# Patient Record
Sex: FEMALE | Race: BLACK OR AFRICAN AMERICAN | NOT HISPANIC OR LATINO | Employment: OTHER | URBAN - METROPOLITAN AREA
[De-identification: names, ages, dates, MRNs, and addresses within clinical notes are randomized per-mention and may not be internally consistent; named-entity substitution may affect disease eponyms.]

---

## 2019-08-13 ENCOUNTER — OFFICE VISIT (OUTPATIENT)
Dept: ENDOCRINOLOGY | Age: 34
End: 2019-08-13

## 2019-08-13 VITALS
HEART RATE: 62 BPM | SYSTOLIC BLOOD PRESSURE: 108 MMHG | HEIGHT: 64 IN | OXYGEN SATURATION: 94 % | WEIGHT: 170 LBS | BODY MASS INDEX: 29.02 KG/M2 | DIASTOLIC BLOOD PRESSURE: 64 MMHG

## 2019-08-13 DIAGNOSIS — E01.0 THYROMEGALY: Primary | ICD-10-CM

## 2019-08-13 PROCEDURE — 99243 OFF/OP CNSLTJ NEW/EST LOW 30: CPT | Performed by: INTERNAL MEDICINE

## 2019-08-13 NOTE — PROGRESS NOTES
Chief Complaint   Patient presents with   • Abnormal Lab   NEW PATIENT APPOINTMENT/ ABNORMAL THYROID LABS     HPI  Sobia Smith,34 y.o. -American female is here as a new patient for evaluation of thyromegaly.  Consulted by Dr. Correa.  Patient reports that 2 months ago when she went to her OB/GYN on a routine physical examination they felt like her thyroid was enlarged.  This led to the thyroid ultrasound.  Thyroid ultrasound from May 2019 showed thyromegaly in the bilateral thyroid lobes but no nodules were noted.  Thyroid labs were also checked which according to the patient have been within normal limits.  No family history of thyroid disease.  She has 1 kid of her own, no issues in getting pregnant.  Second pregnancy she ended up having a miscarriage.  No complaints of fatigue, weight has been stable, no dry skin, no hair loss, sleep is good.  No hyperthyroid symptoms either.  No complaints of shortness of breath, difficulty in swallowing or change in voice.    Denied family history of diabetes.  Reports that her primary care physician is monitoring her blood sugars-HbA1c annually    Reviewed primary care physician's/consulting physician documentation and lab results :     I have reviewed the patient's allergies, medicines, past medical hx, family hx and social hx in detail.    Past Medical History:   Diagnosis Date   • Acne    • Candidiasis of vagina    • Ganglion    • Overweight    • Parotid neoplasm    • Period pain    • Skin neoplasm        Family History   Problem Relation Age of Onset   • No Known Problems Mother    • No Known Problems Father    • No Known Problems Sister    • No Known Problems Brother    • No Known Problems Maternal Grandmother    • No Known Problems Maternal Grandfather    • No Known Problems Paternal Grandmother    • No Known Problems Paternal Grandfather        Social History     Socioeconomic History   • Marital status: Unknown     Spouse name: Not on file   • Number of  "children: Not on file   • Years of education: Not on file   • Highest education level: Not on file   Tobacco Use   • Smoking status: Unknown If Ever Smoked       No Known Allergies    No current outpatient medications on file.     Review of Systems   Constitutional: Negative for appetite change, fatigue and fever.   Eyes: Negative for visual disturbance.   Respiratory: Negative for shortness of breath.    Cardiovascular: Negative for palpitations and leg swelling.   Gastrointestinal: Negative for abdominal pain and vomiting.   Endocrine: Negative for polydipsia and polyuria.   Musculoskeletal: Negative for joint swelling and neck pain.   Skin: Negative for rash.   Neurological: Negative for weakness and numbness.   Psychiatric/Behavioral: Negative for behavioral problems.       Objective:    /64   Pulse 62   Ht 162.6 cm (64\")   Wt 77.1 kg (170 lb)   SpO2 94%   BMI 29.18 kg/m²     Physical Exam  Gen exam       No distress, appears stated age  EYES:             PERRL, anicteric sclera  ENT:                External ears/nose normal, thyromegaly  NECK:             No adenopathy, midline trachea  LUNGS:           Normal chest on inspection, palpation and normal breath sounds.    CV:                  Normal S1S2, without murmur  ABD:                Non tender, non distended, no hepatosplenomegaly, +BS  EXT:                No edema, cyanosis or clubbing    Results Review:    I reviewed the patient's new clinical results.    No results found for any previous visit.     Thyromegaly  Reassured the patient that thyromegaly could sometimes be a possibility after pregnancies or even she could be having that as a young child itself.  Since patient does not have symptoms of shortness of breath, difficulty in swallowing we would not be worrying about her mildly increased thyroid gland       Noted that thyroid function has been within normal limits, reviewed the lab data sent by the primary care physician.    We will be " "discharging the patient from the clinic.    Thank you for asking me to see your patient Sobia Smith in consultation.        Masha Lund MD  08/13/19    EMR Dragon / transcription disclaimer:     \"Dictated utilizing Dragon dictation\".         "

## 2019-08-15 ENCOUNTER — TELEPHONE (OUTPATIENT)
Dept: ENDOCRINOLOGY | Age: 34
End: 2019-08-15

## 2019-08-15 NOTE — TELEPHONE ENCOUNTER
SPOKE WITH PATIENT ABOUT THE DOCUMENTATION THAT SHE NEEDED FAXED TO HER PCP  PATIENT VOICE UNDERSTANDING          ----- Message from Oksana Saeed sent at 8/14/2019  1:56 PM EDT -----  Contact: patient  Patient said when she saw Dr. Lund yesterday for her thyroid  that Dr. Lund was going to put a note in her chart that she does not recommend any kind of treatment. She said she has to have something to submit with her  paperwork that she is cleared. She said it needs to be specific that labs are normal and not recommending any kind of treatment and no medications.   When I told patient that Dr. Lund is out of the office today and you have 24 to 48 hours to reply she said she needs this by the end of the week.     I read the 2 letters from Dr. Lund from 8-13-19 to Dr. Correa to  patient and she asked if she can see those in my chart.     Pt - [phone]

## 2020-02-04 ENCOUNTER — HOSPITAL ENCOUNTER (OUTPATIENT)
Dept: URGENT CARE | Facility: CLINIC | Age: 35
Discharge: HOME | End: 2020-02-04
Attending: EMERGENCY MEDICINE

## 2020-02-06 LAB — BACTERIA SPEC AEROBE CULT: NORMAL

## 2020-10-16 ENCOUNTER — OFFICE VISIT CONVERTED (OUTPATIENT)
Dept: OTOLARYNGOLOGY | Facility: CLINIC | Age: 35
End: 2020-10-16
Attending: OTOLARYNGOLOGY

## 2020-12-31 ENCOUNTER — HOSPITAL ENCOUNTER (OUTPATIENT)
Dept: URGENT CARE | Facility: CLINIC | Age: 35
Discharge: HOME | End: 2020-12-31
Attending: EMERGENCY MEDICINE

## 2021-01-01 LAB — SARS-COV-2 RNA SPEC QL NAA+PROBE: NOT DETECTED

## 2021-01-02 LAB — BACTERIA SPEC AEROBE CULT: NORMAL

## 2021-04-13 ENCOUNTER — OFFICE VISIT CONVERTED (OUTPATIENT)
Dept: ORTHOPEDIC SURGERY | Facility: CLINIC | Age: 36
End: 2021-04-13
Attending: ORTHOPAEDIC SURGERY

## 2021-05-10 NOTE — H&P
History and Physical      Patient Name: Sobia Smith   Patient ID: [id number]   Sex: Female   YOB: 1985    Primary Care Provider: Paige SPRAGUE   Referring Provider: Paige SPRAGUE    Visit Date: October 16, 2020    Provider: Abdulaziz Bueno MD   Location: [facility]   Location Address: [address] [place] [address], [address] [place] [address]  [id number]   Location Phone: [phone]          Chief Complaint     1.  Throat pain    2.  History of laryngitis    3.  Intermittent voice hoarseness       History Of Present Illness  Sobia Smith is a 35 year old /Black female who presents to the office today as a consult from Paige SPRAGUE.      She presents the clinic today for evaluation of issues with intermittent laryngitis, throat pain, and voice hoarseness.  She notes that she is not having any significant trouble with her voice today, but does feel some throat discomfort.  She denies any GERD issues.  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  She describes pain along her larynx.  She notes that she previously had voice changes and hoarseness.  She does find it hard to raise her voice when she is having difficulty with this.  She notes that the only thing that helps her is voice rest.  She denies any progressive throat pain symptoms or dysphagia.  Her weight has been stable.  She is not a smoker.       Past Medical History  Throat discomfort; Voice hoarseness         Past Surgical History  Cyst Removal; Mole Removal         Allergy List  NO KNOWN DRUG ALLERGIES         Family Medical History  Family history of heart disease         Social History  Tobacco (Never)         Review of Systems  · Constitutional  o Denies  o : fever, night sweats, weight loss  · Eyes  o Denies  o : discharge from eye, impaired vision  · HENT  o Admits  o : *See  "HPI  · Cardiovascular  o Denies  o : chest pain, irregular heart beats  · Respiratory  o Denies  o : shortness of breath, wheezing, coughing up blood  · Gastrointestinal  o Denies  o : heartburn, reflux, vomiting blood  · Genitourinary  o Denies  o : frequency  · Integument  o Denies  o : rash, skin dryness  · Neurologic  o Denies  o : seizures, loss of balance, loss of consciousness, dizziness  · Endocrine  o Denies  o : cold intolerance, heat intolerance  · Heme-Lymph  o Denies  o : easy bleeding, anemia      Vitals  Date Time BP Position Site L\R Cuff Size HR RR TEMP (F) WT  HT  BMI kg/m2 BSA m2 O2 Sat FR L/min FiO2 HC       10/16/2020 02:45 PM        97.3 185lbs 2oz 5'  4\" 31.78 1.95             Physical Examination  · Constitutional  o Appearance  o : well developed, well-nourished, alert and in no acute distress, voice clear and strong  · Head and Face  o Head  o :   § Inspection  § : no deformities or lesions  o Face  o :   § Inspection  § : No facial lesions; House-Brackmann I/VI bilaterally  § Palpation  § : No TMJ crepitus nor  muscle tenderness bilaterally  · Eyes  o Vision  o :   § Visual Fields  § : Extraocular movements are intact. No spontaneous or gaze-induced nystagmus.  o Conjunctivae  o : clear  o Sclerae  o : clear  o Pupils and Irises  o : pupils equal, round, and reactive to light.   · Ears, Nose, Mouth and Throat  o Ears  o :   § External Ears  § : appearance within normal limits, no lesions present  § Otoscopic Examination  § : tympanic membrane appearance within normal limits bilaterally without perforations, well-aerated middle ears  § Hearing  § : intact to conversational voice both ears  o Nose  o :   § External Nose  § : appearance normal  § Intranasal Exam  § : mucosa within normal limits, vestibules normal, no intranasal lesions present, septum midline, sinuses non tender to percussion  o Oral Cavity  o :   § Oral Mucosa  § : oral mucosa normal without pallor or " cyanosis  § Lips  § : lip appearance normal  § Teeth  § : normal dentition for age  § Gums  § : gums pink, non-swollen, no bleeding present  § Tongue  § : tongue appearance normal; normal mobility  § Palate  § : hard palate normal, soft palate appearance normal with symmetric mobility  o Throat  o :   § Oropharynx  § : no inflammation or lesions present, tonsils within normal limits  § Hypopharynx  § : appearance within normal limits, superior epiglottis within normal limits  § Larynx  § : appearance within normal limits, vocal cords within normal limits, no lesions present  o Nasopharyngoscopy  o : The patients nares were anesthetized with Lidocaine with Afrin. After this was done, the flexible nasopharyngoscope was passed through both the left and right sides. The nasal cavity is free of any lesions, polyps, or purulence. The vocal folds are bilaterally mobile with good excursion. Vocal folds are slightly thin, symmetric. There is some interarytenoid pachydermia, consistent with GERD.  · Neck  o Inspection/Palpation  o : normal appearance, no masses or tenderness, trachea midline; thyroid size normal, nontender, no nodules or masses present on palpation  · Respiratory  o Respiratory Effort  o : breathing unlabored  o Inspection of Chest  o : normal appearance, no retractions  · Cardiovascular  o Heart  o : regular rate and rhythm  · Lymphatic  o Neck  o : no lymphadenopathy present  o Supraclavicular Nodes  o : no lymphadenopathy present  o Preauricular Nodes  o : no lymphadenopathy present  · Skin and Subcutaneous Tissue  o General Inspection  o : Regarding face and neck - there are no rashes present, no lesions present, and no areas of discoloration  · Neurologic  o Cranial Nerves  o : cranial nerves II-XII are grossly intact bilaterally  o Gait and Station  o : normal gait, able to stand without diffculty  · Psychiatric  o Judgement and Insight  o : judgment and insight intact  o Mood and Affect  o : mood  normal, affect appropriate          Assessment  · GERD (gastroesophageal reflux disease)     530.81/K21.9  · Voice hoarseness     784.42/R49.0  · Throat pain in adult     784.1/R07.0  · Presbylarynx     478.79/J38.7      Plan  · Orders  o Laryngoscopy (Flex) [facility] ([id number]) - 784.1/R07.0, 784.42/R49.0, 478.79/J38.7 - 10/21/2020  · Medications  o Medications have been Reconciled  o Transition of Care or Provider Policy  · Instructions  o She presents the clinic today for evaluation of issues with intermittent laryngitis, throat pain, and voice hoarseness. She notes that she is not having any significant trouble with her voice today, but does feel some throat discomfort. She denies any GERD issues. 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 She describes pain along her larynx. She notes that she previously had voice changes and hoarseness. She does find it hard to raise her voice when she is having difficulty with this. She notes that the only thing that helps her is voice rest. She denies any progressive throat pain symptoms or dysphagia. Her weight has been stable. She is not a smoker. On examination today her voice was clear and strong. I did perform nasolaryngoscopy and this showed for the most part a normal exam, but she did have some mild vocal fold thinning as well as interarytenoid pachydermia, consistent with GERD. We discussed GERD precautions including head of bed elevation, avoiding late night meals and snacking, and avoiding foods that exacerbate GERD. I reassured her that I did not see any lesions or worrisome findings. I also suggested vocal therapy should she continue to have issues. We discussed taking breaks after every hour of speaking to allow her voice to rest. I will plan to see her back in the clinic on an as-needed basis should there be no improvement despite treatment for  this.  o Electronically Identified Patient Education Materials Provided Electronically  · Correspondence  o ENT Letter to Referring MD (Paige SPRAGUE) - 10/21/2020            Electronically Signed by: Abdulaziz Bueno MD -Author on October 21, 2020 11:26:54 AM

## 2021-05-11 NOTE — H&P
History and Physical      Patient Name: Sobia Smith   Patient ID: [id number]   Sex: Female   YOB: 1985    Primary Care Provider: Paige SPRAGUE   Referring Provider: Paige SPRAGUE    Visit Date: April 13, 2021    Provider: Marcelo Luna MD   Location: [facility]   Location Address: [address] [place] [address]  [id number]   Location Phone: [phone]          Chief Complaint  · Right knee pain       History Of Present Illness  Sobia Smith is a 35 year old /Black female who presents today to [facility].      The patient presents here today for evaluation of her right knee. The patient has been having right knee pain for several months. She reports a previous injury to the knee. She was evaluated with x-rays and an MRI of the right knee. She has attended one physical therapy season, which was today. They did not really do much treatment for her since she had the appointment with us later today. She denies previous treatments but does take occasional ibuprofen for the pain. The pain is mostly to the lateral aspect of the knee and is worse when she goes to bed her knee with weight on her foot. She denies instability and mechanical symptoms       Past Medical History  ***No Significant Medical History; Throat discomfort; Voice hoarseness         Past Surgical History  Cyst Removal; Mole Removal; Tonsillectomy         Allergy List  NO KNOWN DRUG ALLERGIES; NO KNOWN DRUG ALLERGIES       Allergies Reconciled  Family Medical History  Heart Disease; Family history of Arthritis; Family history of heart disease         Social History  Alcohol Use (Never); .; lives with children; Recreational Drug Use (Never); Tobacco (Never); Working         Review of Systems  · Constitutional  o Denies  o : fever, chills, weight loss  · Cardiovascular  o Denies  o : chest pain, shortness of breath  · Gastrointestinal  o Denies  o : liver disease, heartburn, nausea,  "blood in stools  · Genitourinary  o Denies  o : painful urination, blood in urine  · Integument  o Denies  o : rash, itching  · Neurologic  o Denies  o : headache, weakness, loss of consciousness  · Musculoskeletal  o Denies  o : painful, swollen joints  · Psychiatric  o Denies  o : drug/alcohol addiction, anxiety, depression      Vitals  Date Time BP Position Site L\R Cuff Size HR RR TEMP (F) WT  HT  BMI kg/m2 BSA m2 O2 Sat FR L/min FiO2        04/13/2021 03:31 PM      88 - R   173lbs 8oz 5'  4\" 29.78 1.89 98 %            Physical Examination  · Constitutional  o Appearance  o : well developed, well-nourished, no obvious deformities present  · Head and Face  o Head  o :   § Inspection  § : normocephalic  o Face  o :   § Inspection  § : no facial lesions  · Eyes  o Conjunctivae  o : conjunctivae normal  o Sclerae  o : sclerae white  · Ears, Nose, Mouth and Throat  o Ears  o :   § External Ears  § : appearance within normal limits  § Hearing  § : intact  o Nose  o :   § External Nose  § : appearance normal  · Neck  o Inspection/Palpation  o : normal appearance  o Range of Motion  o : full range of motion  · Respiratory  o Respiratory Effort  o : breathing unlabored  o Inspection of Chest  o : normal appearance  o Auscultation of Lungs  o : no audible wheezing or rales  · Cardiovascular  o Heart  o : regular rate  · Gastrointestinal  o Abdominal Examination  o : soft and non-tender  · Skin and Subcutaneous Tissue  o General Inspection  o : intact, no rashes  · Psychiatric  o General  o : Alert and oriented x3  o Judgement and Insight  o : judgment and insight intact  o Mood and Affect  o : mood normal, affect appropriate  · Right Knee  o Inspection  o : Tender to palpation over the lateral knee over the lateral distal femur. No effusion. Mild swelling. Skin intact. Positive EHL, FHL, GS, and TA. Sensation intact to light touch all 5 nerves of the foot. Positive Pulses. Stable to anterior and posterior drawer. Stable " to varus and valgus stress. Negative Lachmans. Negative McMurrays.   · Imaging  o Imaging  o : MRI 3/2021 ProScan- laterally tilted patella. Cartilage blister lateral patella. Small effusion.           Assessment  · Right knee pain, unspecified chronicity     719.46/M25.561  · Patellar maltracking, right     719.86/M22.8X1  · Right knee Chondromalacia, patella     733.92/M94.20      Plan  · Medications  o Medications have been Reconciled  o Transition of Care or Provider Policy  · Instructions  o Reviewed the patient's Past Medical, Social, and Family history as well as the ROS at today's visit, no changes.  o Call or return if worsening symptoms.  o This note was transcribed by Eileen Maravilla. [email or web address].  o Discussed the treatment options with the patient. She wished to proceed with conservative treatment. Plan to continue physical therapy and ibuprofen as needed. Follow up in 6-8 weeks to recheck.   o Electronically Identified Patient Education Materials Provided Electronically  · Referrals  o ID: [id number] Date: 04/13/2021 Type: Inbound  Specialty: Orthopedic Surgery            Electronically Signed by: Eileen Maravilla MA -Author on April 14, 2021 01:23:11 PM  Electronically Co-signed by: Marcelo Luna MD -Reviewer on April 14, 2021 10:30:13 PM

## 2021-05-14 VITALS — HEIGHT: 64 IN | OXYGEN SATURATION: 98 % | HEART RATE: 88 BPM | WEIGHT: 173.5 LBS | BODY MASS INDEX: 29.62 KG/M2

## 2021-05-14 VITALS — BODY MASS INDEX: 31.6 KG/M2 | WEIGHT: 185.12 LBS | HEIGHT: 64 IN | TEMPERATURE: 97.3 F

## 2021-07-15 PROBLEM — M22.8X1 PATELLAR MALTRACKING, RIGHT: Status: ACTIVE | Noted: 2021-07-15

## 2021-07-15 PROBLEM — M94.261 CHONDROMALACIA OF RIGHT KNEE: Status: ACTIVE | Noted: 2021-07-15

## 2021-08-12 ENCOUNTER — OFFICE VISIT (OUTPATIENT)
Dept: ORTHOPEDIC SURGERY | Facility: CLINIC | Age: 36
End: 2021-08-12

## 2021-08-12 VITALS — HEART RATE: 80 BPM | WEIGHT: 171.4 LBS | BODY MASS INDEX: 29.26 KG/M2 | HEIGHT: 64 IN | OXYGEN SATURATION: 98 %

## 2021-08-12 DIAGNOSIS — M25.561 ACUTE PAIN OF RIGHT KNEE: Primary | ICD-10-CM

## 2021-08-12 DIAGNOSIS — M76.31 ILIOTIBIAL BAND SYNDROME OF RIGHT SIDE: ICD-10-CM

## 2021-08-12 PROCEDURE — 99213 OFFICE O/P EST LOW 20 MIN: CPT | Performed by: ORTHOPAEDIC SURGERY

## 2021-08-12 PROCEDURE — 20552 NJX 1/MLT TRIGGER POINT 1/2: CPT | Performed by: ORTHOPAEDIC SURGERY

## 2021-08-12 RX ADMIN — METHYLPREDNISOLONE ACETATE 80 MG: 80 INJECTION, SUSPENSION INTRA-ARTICULAR; INTRALESIONAL; INTRAMUSCULAR; SOFT TISSUE at 10:47

## 2021-08-12 RX ADMIN — LIDOCAINE HYDROCHLORIDE 4 ML: 10 INJECTION, SOLUTION INFILTRATION; PERINEURAL at 10:47

## 2021-08-12 NOTE — PROGRESS NOTES
"Chief Complaint  Pain of the Right Knee     Subjective      Sobia Smith presents to [facility] for follow up evaluation of the right knee.  The pain is mostly to the lateral aspect of the knee and is worse when she goes to bed her knee with weight on her foot. She denies instability and mechanical symptoms. She reports a constant deep seated pain. She has attended physical therapy and taken ibuprofen with no relief. She has no new complaints.     No Known Allergies     Social History     Socioeconomic History   • Marital status: Unknown     Spouse name: Not on file   • Number of children: Not on file   • Years of education: Not on file   • Highest education level: Not on file   Tobacco Use   • Smoking status: Never Smoker   Substance and Sexual Activity   • Alcohol use: Never   • Drug use: Never        Review of Systems     Objective   Vital Signs:   Pulse 80   Ht 162.6 cm (64\")   Wt 77.7 kg (171 lb 6.4 oz)   SpO2 98%   BMI 29.42 kg/m²       Physical Exam  Constitutional:       Appearance: Normal appearance. He is well-developed and normal weight.   HENT:      Head: Normocephalic.      Right Ear: Hearing and external ear normal.      Left Ear: Hearing and external ear normal.      Nose: Nose normal.   Eyes:      Conjunctiva/sclera: Conjunctivae normal.   Cardiovascular:      Rate and Rhythm: Normal rate.   Pulmonary:      Effort: Pulmonary effort is normal.      Breath sounds: No wheezing or rales.   Abdominal:      Palpations: Abdomen is soft.      Tenderness: There is no abdominal tenderness.   Musculoskeletal:      Cervical back: Normal range of motion.   Skin:     Findings: No rash.   Neurological:      Mental Status: He is alert and oriented to person, place, and time.   Psychiatric:         Mood and Affect: Mood and affect normal.         Judgment: Judgment normal.       Ortho Exam      Right knee- ROM 0-130 degrees. Stable to varus/valgus stress. Stable to " anterior/posterior drawer. Neurovascularly intact. Tender to the lateral knee over the IT band. Positive EHL, FHL, GS and TA. Sensation intact to all 5 nerves of the foot. Positive pulses. Neurovascularly intact. Negative Lachman's. Negative Jory's.     Right Knee IT band     Date/Time: 8/12/2021 10:47 AM  Performed by: Marcelo Luna MD  Authorized by: Marcelo Luna MD   Preparation: Prepped with Iodine swab, used a 21G needle   Local anesthesia used: no    Anesthesia:  Local anesthesia used: no    Sedation:  Patient sedated: no    Patient tolerance: patient tolerated the procedure well with no immediate complications  Medications administered: 4 mL lidocaine 1 %; 80 mg methylPREDNISolone acetate 80 MG/ML            Imaging Results (Most Recent)     None           Result Review :       No results found.           Assessment and Plan     DX: IT band Syndrome, right knee    Discussed the risks and benefits of an IT band injection. The patient expressed understanding and wished to proceed. She tolerated the injection well.     Call or return if worsening symptoms.    Follow Up     6 weeks      Patient was given instructions and counseling regarding her condition or for health maintenance advice. Please see specific information pulled into the AVS if appropriate.     Scribed for Marcelo Luna MD by Eileen Maravilla.  08/12/21   10:16 EDT    I have personally performed the services described in this document as scribed by the above individual and it is both accurate and complete. Marcelo Luna MD 08/15/21

## 2021-08-15 RX ORDER — LIDOCAINE HYDROCHLORIDE 10 MG/ML
4 INJECTION, SOLUTION INFILTRATION; PERINEURAL
Status: COMPLETED | OUTPATIENT
Start: 2021-08-12 | End: 2021-08-12

## 2021-08-15 RX ORDER — METHYLPREDNISOLONE ACETATE 80 MG/ML
80 INJECTION, SUSPENSION INTRA-ARTICULAR; INTRALESIONAL; INTRAMUSCULAR; SOFT TISSUE
Status: COMPLETED | OUTPATIENT
Start: 2021-08-12 | End: 2021-08-12

## 2021-10-17 PROCEDURE — U0004 COV-19 TEST NON-CDC HGH THRU: HCPCS | Performed by: NURSE PRACTITIONER

## 2021-10-19 ENCOUNTER — TELEPHONE (OUTPATIENT)
Dept: URGENT CARE | Facility: CLINIC | Age: 36
End: 2021-10-19

## 2021-10-19 PROCEDURE — U0004 COV-19 TEST NON-CDC HGH THRU: HCPCS | Performed by: NURSE PRACTITIONER

## 2021-10-20 ENCOUNTER — TELEPHONE (OUTPATIENT)
Dept: URGENT CARE | Facility: CLINIC | Age: 36
End: 2021-10-20

## 2022-08-16 ENCOUNTER — OFFICE VISIT (OUTPATIENT)
Dept: FAMILY MEDICINE | Facility: CLINIC | Age: 37
End: 2022-08-16
Payer: OTHER GOVERNMENT

## 2022-08-16 VITALS
RESPIRATION RATE: 17 BRPM | TEMPERATURE: 98 F | OXYGEN SATURATION: 99 % | SYSTOLIC BLOOD PRESSURE: 128 MMHG | WEIGHT: 181.38 LBS | DIASTOLIC BLOOD PRESSURE: 90 MMHG | BODY MASS INDEX: 30.96 KG/M2 | HEIGHT: 64 IN | HEART RATE: 64 BPM

## 2022-08-16 DIAGNOSIS — M54.50 ACUTE BILATERAL LOW BACK PAIN WITHOUT SCIATICA: Primary | ICD-10-CM

## 2022-08-16 PROCEDURE — 96372 THER/PROPH/DIAG INJ SC/IM: CPT | Mod: ,,, | Performed by: NURSE PRACTITIONER

## 2022-08-16 PROCEDURE — 96372 PR INJECTION,THERAP/PROPH/DIAG2ST, IM OR SUBCUT: ICD-10-PCS | Mod: ,,, | Performed by: NURSE PRACTITIONER

## 2022-08-16 PROCEDURE — 99203 OFFICE O/P NEW LOW 30 MIN: CPT | Mod: 25,,, | Performed by: NURSE PRACTITIONER

## 2022-08-16 PROCEDURE — 99203 PR OFFICE/OUTPT VISIT, NEW, LEVL III, 30-44 MIN: ICD-10-PCS | Mod: 25,,, | Performed by: NURSE PRACTITIONER

## 2022-08-16 RX ORDER — DICLOFENAC SODIUM 75 MG/1
75 TABLET, DELAYED RELEASE ORAL 2 TIMES DAILY
Qty: 30 TABLET | Refills: 0 | Status: SHIPPED | OUTPATIENT
Start: 2022-08-16 | End: 2023-06-12

## 2022-08-16 RX ORDER — CYCLOBENZAPRINE HCL 10 MG
10 TABLET ORAL 3 TIMES DAILY PRN
Qty: 30 TABLET | Refills: 0 | Status: SHIPPED | OUTPATIENT
Start: 2022-08-16 | End: 2022-08-26

## 2022-08-16 RX ORDER — KETOROLAC TROMETHAMINE 30 MG/ML
60 INJECTION, SOLUTION INTRAMUSCULAR; INTRAVENOUS
Status: COMPLETED | OUTPATIENT
Start: 2022-08-16 | End: 2022-08-16

## 2022-08-16 RX ORDER — DEXAMETHASONE SODIUM PHOSPHATE 4 MG/ML
8 INJECTION, SOLUTION INTRA-ARTICULAR; INTRALESIONAL; INTRAMUSCULAR; INTRAVENOUS; SOFT TISSUE ONCE
Status: COMPLETED | OUTPATIENT
Start: 2022-08-16 | End: 2022-08-16

## 2022-08-16 RX ADMIN — DEXAMETHASONE SODIUM PHOSPHATE 8 MG: 4 INJECTION, SOLUTION INTRA-ARTICULAR; INTRALESIONAL; INTRAMUSCULAR; INTRAVENOUS; SOFT TISSUE at 12:08

## 2022-08-16 RX ADMIN — KETOROLAC TROMETHAMINE 60 MG: 30 INJECTION, SOLUTION INTRAMUSCULAR; INTRAVENOUS at 12:08

## 2022-08-16 NOTE — PATIENT INSTRUCTIONS
Alternate heat and ice for first 48 hours then  apply heat. You may do gently stretching if tolerable.    Moist warm compresss to area several times daily.  May use a heating pad on LOW to provide heat over a towel which was dampended with warm water. DO NOT FALL ASLEEP WITH HEATING PAD ON.  Do not stay in one position to long.  When sleeping on your back place a pillow under knees to reduce tension on back.  If sleeping on your side, place pillow between knees to keep spine in better alinement.  Wear supportive shoes such as tennis shoes for support of the lower back.  Take any medication as directed.    If you were not prescribed an anti-inflammatory medication, and if you do not have any history of stomach/intestinal ulcers, or kidney disease, or are not taking a blood thinner such as Coumadin, Plavix, Pradaxa, Eloquis, or Xaralta for example, it is OK to take over the counter Ibuprofen or Advil or Motrin or Aleve as directed.  Do not take these medications on an empty stomach.    If you were prescribed a narcotic medication, do not drive or operate heavy equipment or machinery while taking these medications.    If you lose control of your bowel and/or bladder, please go to the nearest Emergency Department immediately.  If you lose sensation in between your legs by your genitalia and/or rectum, please go to the nearest Emergency Department immediately.  If you lose control or sensation of any extremity, please go to the nearest Emergency Department immediately.

## 2022-08-16 NOTE — PROGRESS NOTES
Subjective:       Patient ID: Lissett MCQUEEN is a 37 y.o. female.    Chief Complaint: Back Pain (Lower back pain starting yesterday. Lifting heavy object. )    Bilateral lower back pain after lifting a heavy object a few days ago- denies nay numbness or tingling no issue with bowel or bladder incontinence    Back Pain  This is a recurrent problem. The current episode started in the past 7 days. The pain is present in the lumbar spine. The quality of the pain is described as aching. The pain does not radiate. The pain is at a severity of 4/10. The pain is moderate. The symptoms are aggravated by lying down, position and sitting. Pertinent negatives include no abdominal pain, bladder incontinence, bowel incontinence, chest pain, dysuria, fever, headaches, leg pain, numbness, paresis, paresthesias, pelvic pain, perianal numbness, tingling, weakness or weight loss. She has tried NSAIDs for the symptoms. The treatment provided no relief.     Review of Systems   Constitutional: Negative for fever and weight loss.   Cardiovascular: Negative for chest pain.   Gastrointestinal: Negative for abdominal pain and bowel incontinence.   Genitourinary: Negative for bladder incontinence, dysuria and pelvic pain.   Musculoskeletal: Positive for back pain. Negative for leg pain.   Neurological: Negative for tingling, weakness, numbness, headaches and paresthesias.         Objective:      Physical Exam  Vitals and nursing note reviewed.   Constitutional:       General: She is not in acute distress.     Appearance: Normal appearance. She is normal weight. She is not ill-appearing, toxic-appearing or diaphoretic.   Cardiovascular:      Rate and Rhythm: Normal rate and regular rhythm.      Pulses: Normal pulses.      Heart sounds: Normal heart sounds. No murmur heard.  Pulmonary:      Effort: Pulmonary effort is normal.      Breath sounds: Normal breath sounds. No wheezing or rhonchi.   Musculoskeletal:         General: Tenderness  present.      Cervical back: Normal range of motion and neck supple.      Lumbar back: Tenderness present. No swelling. Decreased range of motion. Negative right straight leg raise test and negative left straight leg raise test.      Right lower leg: No edema.      Left lower leg: No edema.   Skin:     General: Skin is warm and dry.      Capillary Refill: Capillary refill takes less than 2 seconds.      Findings: No lesion or rash.   Neurological:      Mental Status: She is alert and oriented to person, place, and time.      Motor: No weakness.      Gait: Gait normal.   Psychiatric:         Mood and Affect: Mood normal.         Behavior: Behavior normal.         Thought Content: Thought content normal.         Judgment: Judgment normal.         No visits with results within 6 Month(s) from this visit.   Latest known visit with results is:   No results found for any previous visit.      Assessment:       1. Acute bilateral low back pain without sciatica        Plan:   Acute bilateral low back pain without sciatica  -     dexamethasone injection 8 mg  -     ketorolac injection 60 mg  -     diclofenac (VOLTAREN) 75 MG EC tablet; Take 1 tablet (75 mg total) by mouth 2 (two) times daily.  Dispense: 30 tablet; Refill: 0  -     cyclobenzaprine (FLEXERIL) 10 MG tablet; Take 1 tablet (10 mg total) by mouth 3 (three) times daily as needed for Muscle spasms.  Dispense: 30 tablet; Refill: 0

## 2022-10-12 DIAGNOSIS — M25.561 RIGHT KNEE PAIN, UNSPECIFIED CHRONICITY: Primary | ICD-10-CM

## 2022-10-21 DIAGNOSIS — M25.561 RIGHT KNEE PAIN, UNSPECIFIED CHRONICITY: Primary | ICD-10-CM

## 2023-06-12 ENCOUNTER — OFFICE VISIT (OUTPATIENT)
Dept: FAMILY MEDICINE | Facility: CLINIC | Age: 38
End: 2023-06-12
Payer: OTHER GOVERNMENT

## 2023-06-12 VITALS
RESPIRATION RATE: 18 BRPM | TEMPERATURE: 100 F | HEIGHT: 64 IN | HEART RATE: 78 BPM | OXYGEN SATURATION: 97 % | WEIGHT: 184.63 LBS | DIASTOLIC BLOOD PRESSURE: 78 MMHG | SYSTOLIC BLOOD PRESSURE: 122 MMHG | BODY MASS INDEX: 31.52 KG/M2

## 2023-06-12 DIAGNOSIS — Z11.59 ENCOUNTER FOR SCREENING FOR VIRAL DISEASE: ICD-10-CM

## 2023-06-12 DIAGNOSIS — U07.1 COVID: Primary | ICD-10-CM

## 2023-06-12 PROBLEM — L72.3 SEBACEOUS CYST: Status: ACTIVE | Noted: 2023-06-12

## 2023-06-12 PROBLEM — L90.5 SCAR: Status: ACTIVE | Noted: 2023-06-12

## 2023-06-12 PROBLEM — Q82.5 CONGENITAL PIGMENTED MELANOCYTIC NEVUS: Status: ACTIVE | Noted: 2023-06-12

## 2023-06-12 PROBLEM — Z02.89 HEALTH EXAMINATION OF DEFINED SUBPOPULATION: Status: ACTIVE | Noted: 2023-06-12

## 2023-06-12 PROBLEM — N94.6 DYSMENORRHEA: Status: ACTIVE | Noted: 2023-06-12

## 2023-06-12 PROBLEM — N76.0 VAGINITIS AND VULVOVAGINITIS: Status: ACTIVE | Noted: 2023-06-12

## 2023-06-12 PROBLEM — B37.9 CANDIDIASIS: Status: ACTIVE | Noted: 2023-06-12

## 2023-06-12 PROBLEM — E66.3 OVERWEIGHT: Status: ACTIVE | Noted: 2023-06-12

## 2023-06-12 PROBLEM — Z01.12 ENCOUNTER FOR HEARING CONSERVATION AND TREATMENT: Status: ACTIVE | Noted: 2023-06-12

## 2023-06-12 PROBLEM — Z11.8 SPECIAL SCREENING EXAMINATION FOR OTHER SPECIFIED CHLAMYDIAL DISEASES: Status: ACTIVE | Noted: 2023-06-12

## 2023-06-12 PROBLEM — D49.0 PAROTID NEOPLASM: Status: ACTIVE | Noted: 2023-06-12

## 2023-06-12 PROBLEM — Z76.0 ENCOUNTER FOR MEDICATION REFILL: Status: ACTIVE | Noted: 2023-06-12

## 2023-06-12 PROBLEM — M54.2 NECK PAIN: Status: ACTIVE | Noted: 2023-06-12

## 2023-06-12 PROBLEM — Z23 NEED FOR PROPHYLACTIC VACCINATION WITH TYPHOID-PARATYPHOID ALONE (TAB): Status: ACTIVE | Noted: 2023-06-12

## 2023-06-12 PROBLEM — Z00.00 ROUTINE GENERAL MEDICAL EXAMINATION AT A HEALTH CARE FACILITY: Status: ACTIVE | Noted: 2023-06-12

## 2023-06-12 PROBLEM — Z91.89 PERSONAL HISTORY PRESENTING HAZARDS TO HEALTH: Status: ACTIVE | Noted: 2023-06-12

## 2023-06-12 PROBLEM — Z30.09 GENERAL COUNSELING FOR INITIATION OF OTHER CONTRACEPTIVE MEASURES: Status: ACTIVE | Noted: 2023-06-12

## 2023-06-12 PROBLEM — M25.561 PAIN IN RIGHT KNEE: Status: ACTIVE | Noted: 2023-06-12

## 2023-06-12 PROBLEM — D22.9 CONGENITAL PIGMENTED MELANOCYTIC NEVUS: Status: ACTIVE | Noted: 2023-06-12

## 2023-06-12 PROBLEM — Z13.89 SPECIAL SCREENING FOR OTHER CONDITIONS: Status: ACTIVE | Noted: 2023-06-12

## 2023-06-12 PROBLEM — M67.48: Status: ACTIVE | Noted: 2018-11-08

## 2023-06-12 PROBLEM — D49.2 SKIN NEOPLASM: Status: ACTIVE | Noted: 2023-06-12

## 2023-06-12 PROBLEM — Z48.89 OTHER SPECIFIED AFTERCARE FOLLOWING SURGERY: Status: ACTIVE | Noted: 2023-06-12

## 2023-06-12 PROBLEM — Z12.39 ENCOUNTER FOR SCREENING FOR MALIGNANT NEOPLASM OF BREAST: Status: ACTIVE | Noted: 2023-06-12

## 2023-06-12 PROBLEM — Z12.4 SCREENING FOR MALIGNANT NEOPLASM OF CERVIX: Status: ACTIVE | Noted: 2023-06-12

## 2023-06-12 PROBLEM — Z48.89 ENCOUNTER FOR POSTOPERATIVE CARE: Status: ACTIVE | Noted: 2023-06-12

## 2023-06-12 PROBLEM — Z47.89 ORTHOPEDIC AFTERCARE: Status: ACTIVE | Noted: 2023-06-12

## 2023-06-12 PROBLEM — B37.31 CANDIDIASIS OF VAGINA: Status: ACTIVE | Noted: 2023-06-12

## 2023-06-12 PROBLEM — Z13.29 SCREENING FOR THYROID DISORDER: Status: ACTIVE | Noted: 2023-06-12

## 2023-06-12 PROBLEM — Z02.9 ENCOUNTER FOR ADMINISTRATIVE EXAMINATIONS, UNSPECIFIED: Status: ACTIVE | Noted: 2022-11-29

## 2023-06-12 LAB
CTP QC/QA: YES
FLUAV AG NPH QL: NEGATIVE
FLUBV AG NPH QL: NEGATIVE
SARS-COV-2 AG RESP QL IA.RAPID: POSITIVE

## 2023-06-12 PROCEDURE — 99213 OFFICE O/P EST LOW 20 MIN: CPT | Mod: ,,, | Performed by: NURSE PRACTITIONER

## 2023-06-12 PROCEDURE — 87428 SARSCOV & INF VIR A&B AG IA: CPT | Mod: QW,,, | Performed by: NURSE PRACTITIONER

## 2023-06-12 PROCEDURE — 99213 PR OFFICE/OUTPT VISIT, EST, LEVL III, 20-29 MIN: ICD-10-PCS | Mod: ,,, | Performed by: NURSE PRACTITIONER

## 2023-06-12 PROCEDURE — 87428 POCT SARS-COV2 (COVID) WITH FLU ANTIGEN: ICD-10-PCS | Mod: QW,,, | Performed by: NURSE PRACTITIONER

## 2023-06-12 RX ORDER — IBUPROFEN 800 MG/1
800 TABLET ORAL EVERY 8 HOURS PRN
Qty: 60 TABLET | Refills: 0 | Status: SHIPPED | OUTPATIENT
Start: 2023-06-12

## 2023-06-12 RX ORDER — PROMETHAZINE HYDROCHLORIDE 25 MG/1
25 TABLET ORAL EVERY 6 HOURS PRN
Qty: 30 TABLET | Refills: 0 | Status: SHIPPED | OUTPATIENT
Start: 2023-06-12

## 2023-06-12 NOTE — PROGRESS NOTES
"Subjective:       Patient ID: Lissett MCQUEEN is a 38 y.o. female.    Chief Complaint: Generalized Body Aches (Started Saturday), Fever (Started Saturday. OTC taken with no relief), Chills (Started Saturday), and Headache (Started Saturday)    Presents to clinic as above. No chronic health problems. Has had vaccines.     Review of Systems   Constitutional:  Positive for chills, fever and malaise/fatigue.   HENT:  Negative for congestion, sinus pain and sore throat.    Respiratory: Negative.     Cardiovascular: Negative.    Gastrointestinal: Negative.    Musculoskeletal:  Positive for myalgias.   Neurological:  Positive for headaches.        Reviewed family, medical, surgical, and social history.    Objective:      /78 (BP Location: Right arm, Patient Position: Sitting, BP Method: Large (Manual))   Pulse 78   Temp 99.6 °F (37.6 °C) (Oral)   Resp 18   Ht 5' 4" (1.626 m)   Wt 83.7 kg (184 lb 9.6 oz)   LMP 06/05/2023 (Approximate)   SpO2 97%   BMI 31.69 kg/m²   Physical Exam  Vitals and nursing note reviewed.   Constitutional:       General: She is not in acute distress.     Appearance: Normal appearance. She is normal weight. She is not ill-appearing, toxic-appearing or diaphoretic.   HENT:      Head: Normocephalic.      Right Ear: Hearing, tympanic membrane, ear canal and external ear normal.      Left Ear: Hearing, tympanic membrane, ear canal and external ear normal.      Nose: No mucosal edema, congestion or rhinorrhea.      Right Turbinates: Not enlarged or swollen.      Left Turbinates: Not enlarged or swollen.      Right Sinus: No maxillary sinus tenderness or frontal sinus tenderness.      Left Sinus: No maxillary sinus tenderness or frontal sinus tenderness.      Mouth/Throat:      Lips: Pink.      Mouth: Mucous membranes are moist.      Pharynx: Uvula midline. No pharyngeal swelling, oropharyngeal exudate, posterior oropharyngeal erythema or uvula swelling.      Tonsils: No tonsillar " exudate or tonsillar abscesses.   Cardiovascular:      Rate and Rhythm: Normal rate and regular rhythm.      Heart sounds: Normal heart sounds.   Pulmonary:      Effort: Pulmonary effort is normal.      Breath sounds: Normal breath sounds.   Musculoskeletal:      Cervical back: Normal range of motion and neck supple. No rigidity or tenderness.   Lymphadenopathy:      Cervical: No cervical adenopathy.   Skin:     General: Skin is warm and dry.   Neurological:      Mental Status: She is alert.   Psychiatric:         Mood and Affect: Mood normal.         Behavior: Behavior normal.         Thought Content: Thought content normal.         Judgment: Judgment normal.          Office Visit on 06/12/2023   Component Date Value Ref Range Status    SARS Coronavirus 2 Antigen 06/12/2023 Positive (A)  Negative Final    Rapid Influenza A Ag 06/12/2023 Negative  Negative Final    Rapid Influenza B Ag 06/12/2023 Negative  Negative Final     Acceptable 06/12/2023 Yes   Final      Assessment:       1. COVID    2. Encounter for screening for viral disease        Plan:       COVID  -     ibuprofen (ADVIL,MOTRIN) 800 MG tablet; Take 1 tablet (800 mg total) by mouth every 8 (eight) hours as needed for Pain (headache or body aches.).  Dispense: 60 tablet; Refill: 0  -     promethazine (PHENERGAN) 25 MG tablet; Take 1 tablet (25 mg total) by mouth every 6 (six) hours as needed for Nausea (or headache).  Dispense: 30 tablet; Refill: 0    Encounter for screening for viral disease  -     POCT SARS-COV2 (COVID) with Flu Antigen    Quarantine for 7 more days  OTC meds for symptomatic relief  Offered Paxlovid. Discussed Risks and benefits. declined  Drink plenty of fluids  Go to ER with any respiratory distress  Copy of result and work/school note given  RTC PRN             Risks, benefits, and side effects were discussed with the patient. All questions were answered to the fullest satisfaction of the patient, and pt verbalized  understanding and agreement to treatment plan. Pt was to call with any new or worsening symptoms, or present to the ER.

## 2023-09-11 PROBLEM — Z13.29 SCREENING FOR THYROID DISORDER: Status: RESOLVED | Noted: 2023-06-12 | Resolved: 2023-09-11

## 2023-09-11 PROBLEM — Z13.89 SPECIAL SCREENING FOR OTHER CONDITIONS: Status: RESOLVED | Noted: 2023-06-12 | Resolved: 2023-09-11

## 2023-09-11 PROBLEM — Z00.00 ROUTINE GENERAL MEDICAL EXAMINATION AT A HEALTH CARE FACILITY: Status: RESOLVED | Noted: 2023-06-12 | Resolved: 2023-09-11
